# Patient Record
Sex: FEMALE | Race: OTHER
[De-identification: names, ages, dates, MRNs, and addresses within clinical notes are randomized per-mention and may not be internally consistent; named-entity substitution may affect disease eponyms.]

---

## 2020-05-04 ENCOUNTER — HOSPITAL ENCOUNTER (INPATIENT)
Dept: HOSPITAL 11 - JP.OB | Age: 20
LOS: 2 days | Discharge: HOME | End: 2020-05-06
Attending: ADVANCED PRACTICE MIDWIFE | Admitting: ADVANCED PRACTICE MIDWIFE
Payer: MEDICAID

## 2020-05-04 DIAGNOSIS — O48.0: Primary | ICD-10-CM

## 2020-05-04 DIAGNOSIS — Q79.60: ICD-10-CM

## 2020-05-04 DIAGNOSIS — Z3A.40: ICD-10-CM

## 2020-05-04 DIAGNOSIS — O99.89: ICD-10-CM

## 2020-05-04 PROCEDURE — 0KQM0ZZ REPAIR PERINEUM MUSCLE, OPEN APPROACH: ICD-10-PCS | Performed by: ADVANCED PRACTICE MIDWIFE

## 2020-05-04 PROCEDURE — 00HU33Z INSERTION OF INFUSION DEVICE INTO SPINAL CANAL, PERCUTANEOUS APPROACH: ICD-10-PCS | Performed by: ADVANCED PRACTICE MIDWIFE

## 2020-05-04 PROCEDURE — 3E0R3BZ INTRODUCTION OF ANESTHETIC AGENT INTO SPINAL CANAL, PERCUTANEOUS APPROACH: ICD-10-PCS | Performed by: ADVANCED PRACTICE MIDWIFE

## 2020-05-04 NOTE — ANES
DATE OF SERVICE:  05/04/2020

 

INDICATIONS:  Jennifer is a 20-year-old female, patient of Berta Reynolds in our obstetrics

unit. G1, P0 at 39 weeks and 5 days.  I was asked to assess her for labor epidural.  Upon

arrival, I found a 20-year-old healthy female.  Obesity.  Reviewed with her, her health

history as well as lab work and the procedure.  Found no contraindications and she was okay

to proceed.  Consent was received.

 

DESCRIPTION OF PROCEDURE:  I had her seated at the edge of the bed.  Betadine prep x3 to

lumbar region.  Sterile drape was placed, 1% lidocaine skin wheal as well as deep at the L3-

L4 region.  A 17-gauge Tuohy was placed to loss of resistance.  Negative CSF, negative heme,

negative paresthesia.  I placed silicone catheter to 14 cm.  The catheter was secured after

a test dose of 3 mL of 1.5% lidocaine and 1:200,000 epinephrine.  She tolerated the test

dose without any issues.  I placed her in the supine position, dosed her with 12 mL of 0.2%

ropivacaine and began infusion of 12 mL an hour of that same 0.2% ropivacaine.  She

tolerated that procedure quite well.  Please refer to nursing notes for vital signs and

neuro status, which are unchanged.

 

Thank you for the procedure.

 

 

 

 

Ino Oneill CRNA

DD:  05/04/2020 17:15:38

DT:  05/04/2020 22:48:49

Job #:  [id number]

## 2020-05-04 NOTE — PCM.PNLD
Labor Progress Note





- VS & Meds


Vital Signs: 


 Last Vital Signs











Temp  97.4 F   05/04/20 10:50


 


Pulse  108 H  05/04/20 15:31


 


Resp  18   05/04/20 15:31


 


BP  136/97 H  05/04/20 15:31


 


Pulse Ox  95   05/04/20 15:31











Active Medications: 


 Current Medications





Acetaminophen (Tylenol)  650 mg PO Q4H PRN


   PRN Reason: Pain (Mild 1-3) and fever


Ephedrine Sulfate (Ephedrine Sulfate)  10 mg IVPUSH ASDIRECTED PRN


   PRN Reason: Hypotension


Sodium Chloride (Saline Flush)  10 ml FLUSH ASDIRECTED PRN


   PRN Reason: Keep Vein Open





Discontinued Medications





Fentanyl (Sublimaze)  100 mcg IVPUSH ONETIME ONE


   Stop: 05/04/20 15:02


   Last Admin: 05/04/20 15:37 Dose:  100 mcg


Lactated Ringer's (Ringers, Lactated)  1,000 mls @ 999 mls/hr IV BOLUS ONE


   Stop: 05/04/20 11:13


   Last Admin: 05/04/20 17:23 Dose:  999 mls/hr


Oxytocin/Sodium Chloride (Pitocin In Ns 20 Units/1,000 Ml)  20 unit in 1,000 

mls @ 999 mls/hr IV ONETIME ONE; Protocol


   Stop: 05/04/20 11:13


Lactated Ringer's (Ringers, Lactated)  1,000 mls @ 999 mls/hr IV .BOLUS ONE


   Stop: 05/04/20 16:46


Oxytocin/Sodium Chloride (Pitocin In Ns 20 Units/1,000 Ml)  20 unit in 1,000 

mls @ 999 mls/hr IV ONETIME ONE; Protocol


   Stop: 05/04/20 17:30


Ropivacaine (Naropin 0.2%) Confirm Administered Dose 100 mls @ as directed 

.ROUTE .STK-MED ONE


   Stop: 05/04/20 17:22


Misoprostol (Cytotec)  50 mcg VAG ONETIME ONE


   Stop: 05/04/20 07:38


   Last Admin: 05/04/20 07:41 Dose:  50 mcg


Misoprostol (Cytotec)  50 mcg VAG ONETIME ONE


   Stop: 05/04/20 10:16


   Last Admin: 05/04/20 11:59 Dose:  50 mcg


Misoprostol (Cytotec)  25 mcg VAG ONETIME ONE


   Stop: 05/04/20 12:01


   Last Admin: 05/04/20 13:01 Dose:  Not Given











- Uterine Contractions


Uterine Monitoring Mode: None in Use


Contraction Frequency (min): 2-3


Contraction Duration (sec): 20-60


Contraction Intensity: Mild to Moderate


Uterine Resting Tone: Soft





- Fetal Monitoring


Fetal Monitor Mode: Doppler/Auscultation


Fetal Heart Rate (FHR) Baseline: 150


Fetal Heart Rate (FHR) Variability: Moderate (6-25 bmp)


Fetal Accelerations: Present, 15x15


Fetal Decelerations: None


Fetal Strip Review: Category I





- Vaginal Exam


Dilation (cm): 4


Effacement (Percent): 90


Station: 1


Cervical Position: Anterior


Sterile Vaginal Exam Performed By: Taylor Reynolds


Vaginal Exam Comment: AROM clear fluid.





- Labor Progress (Free Text)


Labor Progress: 





active labor


epidural inserted.


Cat one strip





plan for vaginal delivery

## 2020-05-04 NOTE — PCM.PNLD
Labor Progress Note





- VS & Meds


Vital Signs: 


 Last Vital Signs











Temp  97.4 F   05/04/20 10:50


 


Pulse  101 H  05/04/20 12:20


 


Resp  18   05/04/20 12:20


 


BP  141/96 H  05/04/20 12:20


 


Pulse Ox  98   05/04/20 12:20











Active Medications: 


 Current Medications





Acetaminophen (Tylenol)  650 mg PO Q4H PRN


   PRN Reason: Pain (Mild 1-3) and fever


Sodium Chloride (Saline Flush)  10 ml FLUSH ASDIRECTED PRN


   PRN Reason: Keep Vein Open





Discontinued Medications





Fentanyl (Sublimaze)  100 mcg IVPUSH ONETIME ONE


   Stop: 05/04/20 15:02


Lactated Ringer's (Ringers, Lactated)  1,000 mls @ 999 mls/hr IV BOLUS ONE


   Stop: 05/04/20 11:13


Oxytocin/Sodium Chloride (Pitocin In Ns 20 Units/1,000 Ml)  20 unit in 1,000 

mls @ 999 mls/hr IV ONETIME ONE; Protocol


   Stop: 05/04/20 11:13


Misoprostol (Cytotec)  50 mcg VAG ONETIME ONE


   Stop: 05/04/20 07:38


   Last Admin: 05/04/20 07:41 Dose:  50 mcg


Misoprostol (Cytotec)  50 mcg VAG ONETIME ONE


   Stop: 05/04/20 10:16


   Last Admin: 05/04/20 11:59 Dose:  50 mcg


Misoprostol (Cytotec)  25 mcg VAG ONETIME ONE


   Stop: 05/04/20 12:01


   Last Admin: 05/04/20 13:01 Dose:  Not Given











- Uterine Contractions


Uterine Monitoring Mode: None in Use


Contraction Frequency (min): 2-3


Contraction Duration (sec): 20-60


Contraction Intensity: Mild to Moderate


Uterine Resting Tone: Soft





- Fetal Monitoring


Fetal Monitor Mode: Doppler/Auscultation


Fetal Heart Rate (FHR) Baseline: 150


Fetal Heart Rate (FHR) Variability: Moderate (6-25 bmp)


Fetal Accelerations: Present, 15x15


Fetal Decelerations: None


Fetal Strip Review: Category I





- Vaginal Exam


Dilation (cm): 2-3


Effacement (Percent): 80


Station: 0


Cervical Position: Midposition


Sterile Vaginal Exam Performed By: Taylor Reynolds


Vaginal Exam Comment: bulging bag present.





- Labor Progress (Free Text)


Labor Progress: 





contractions stronger. cervical change from noon.


Requesting an epidural


Fentanyl given now for pain management





Plan


Vaginal delivery later


AROM after epidural

## 2020-05-04 NOTE — ANES
DATE OF SERVICE:  05/04/2020

 

Jennifer is a 20-year-old female patient of Taylor Reynolds in our Obstetric Unit.  I was

asked to come in and evaluate epidural due to the fact she had some increased pain.

Placement was intact.  I bolused the epidural with 5 mL of 0.2% ropivacaine and 2 mL with

100 mcg of fentanyl.  She tolerated that quite well.

 

 

 

 

Ino Oneill CRNA

DD:  05/04/2020 19:46:07

DT:  05/04/2020 22:48:33

Job #:  [id number]

## 2020-05-04 NOTE — PCM.DEL
L & D Note





- General Info


Date of Service: 20 (childbirth)


Mother's Due Date: 20





- Delivery Note


Labor: Spontaneous


Delivery Outcome: Livebirth


Infant Delivery Method: Spontaneous Vaginal Delivery-Single


Infant Delivery Mode: Spontaneous


Birth Presentation: Vertex


Nuchal Cord: None


Anesthesia Type: Epidural


Amniotic Fluid Description: Clear


Laceration: 2nd Degree, Labial, Perineal


Suture type: Chromic


Suture size: 3-0


Placenta: Intact, Expressed (Fernandez)


Cord: 3 Vessels


Estimated Blood Loss: 500


[place]: Bulb Syringe, Stimulated, Warmed, [place] Used


 Provider: Taylor Reynolds


APGAR Score 1 min: 8 (color)


APGAR Score 5 min: 9 (color)


Second Stage Interventions: Reports: Second Nurse Reviewed Fetal Heart Tones, 

Encouragement Given, Pushing Effectively, Pushing, McRobert's Position


Delivery Comments (Free Text/Narrative):: 


This 20 year old G3 now P1 who is 40 1/7 weeks gestation delivered via  at 

2130 in JESUS position. The  was placed on mother's abdomen where he cried 

spontaneously. Apgars 8,9 all for color. Three vessel cord, Delayed cord 

clamping was done and skin to skin with mother. The placenta was expressed 

intact a Fernandez.


Second degree tear of perineum and both labia with significant bleeding. All 

repaired in standard fashion with 3-0 Vicryl. Hemostasis was obtained. greater 

then 500cc of blood loss.


No lacerations of her cervix, vagina, rectum were found.


Mother and baby to post partum in stable condition


weight 9 pounds


first stage [id number]


Second stage 9112-8123


Third stage 3507-5215








Induction Criteria





- Olivas Score


Olivas Score Dilation: 1-2 cm


Olivas Score Effacement: 60-70%


Olivas Score Infant's Station: -1 ,0


Olivas Score Consistency: Soft


Olivas Score Cervix Position: Posterior


Olivas Score Total: 7


Olivas Score Presenting Part: Reports: Cephalic





- Induction


Gestational Age >/= 39 wks: Yes


Estimated Pelvis: Reports: Adequate


Reassuring Fetal Monitoring Strip: Yes


Absence of Tachy Systole: Yes





- General Info


Date of Service: 20


Admission Dx/Problem (Free Text): 


 Patient Status Order with Admit Dx/Problem





20 07:56


Patient Status [ADT] Routine 








 Admission Diagnosis/Problem











Admission Diagnosis/Problem    Pregnancy














Functional Status: Reports: Pain Controlled





- Review of Systems


General: Reports: No Symptoms


HEENT: Reports: No Symptoms


Pulmonary: Reports: No Symptoms


Cardiovascular: Reports: No Symptoms


Gastrointestinal: Reports: No Symptoms


Genitourinary: Reports: No Symptoms


Musculoskeletal: Reports: No Symptoms


Skin: Reports: No Symptoms


Neurological: Reports: No Symptoms


Psychiatric: Reports: No Symptoms





- Patient Data


Vitals - Most Recent: 


 Last Vital Signs











Temp  97.4 F   20 10:50


 


Pulse  121 H  20 18:15


 


Resp  18   20 18:15


 


BP  138/86   20 18:15


 


Pulse Ox  98   20 18:15











Weight - Most Recent: 201 lb


I&O - Last 24 Hours: 


 Intake & Output











 20





 14:59 22:59 06:59


 


Intake Total 800  


 


Output Total  525 


 


Balance 800 -525 











Lab Results Last 24 Hours: 


 Laboratory Results - last 24 hr











  20 Range/Units





  07:35 07:44 22:00 


 


WBC   7.7   (4.5-11.0)  K/uL


 


RBC   4.20   (3.30-5.50)  M/uL


 


Hgb   11.6 L   (12.0-15.0)  g/dL


 


Hct   35.2 L   (36.0-48.0)  %


 


MCV   84   (80-98)  fL


 


MCH   28   (27-31)  pg


 


MCHC   33   (32-36)  %


 


Plt Count   246   (150-400)  K/uL


 


Neut % (Auto)   70 H   (36-66)  %


 


Lymph % (Auto)   20 L   (24-44)  %


 


Mono % (Auto)   10 H   (2-6)  %


 


Eos % (Auto)   1 L   (2-4)  %


 


Baso % (Auto)   0   (0-1)  %


 


Urine Color  Yellow    (YELLOW)  


 


Urine Appearance  Cloudy A    (CLEAR)  


 


Urine pH  6.0    (5.0-8.0)  


 


Ur Specific Gravity  1.025    (1.008-1.030)  


 


Urine Protein  Negative    (NEGATIVE)  mg/dL


 


Urine Glucose (UA)  100 H    (NEGATIVE)  mg/dL


 


Urine Ketones  Trace H    (NEGATIVE)  mg/dL


 


Urine Occult Blood  Negative    (NEGATIVE)  


 


Urine Nitrite  Negative    (NEGATIVE)  


 


Urine Bilirubin  Negative    (NEGATIVE)  


 


Urine Urobilinogen  0.2    (0.2-1.0)  EU/dL


 


Ur Leukocyte Esterase  Small H    (NEGATIVE)  


 


Urine RBC  0-5    (0-5)  


 


Urine WBC  5-10 H    (0-5)  


 


Ur Epithelial Cells  Moderate    


 


Amorphous Sediment  Not seen    


 


Urine Bacteria  Moderate    


 


Urine Mucus  Few    


 


Blood Type    B POSITIVE  


 


Gel Antibody Screen    Negative  


 


Crossmatch    See Detail  











Med Orders - Current: 


 Current Medications





Acetaminophen (Tylenol)  650 mg PO Q4H PRN


   PRN Reason: Pain (Mild 1-3) and fever


Acetaminophen (Tylenol Bulk Bottle)  0 mg PO Q4H PRN


   PRN Reason: Pain


Docusate Sodium (Colace)  100 mg PO BID NAIMA


Ephedrine Sulfate (Ephedrine Sulfate)  10 mg IVPUSH ASDIRECTED PRN


   PRN Reason: Hypotension


   Last Admin: 20 17:55 Dose:  10 mg


Ferrous Sulfate (Ferrous Sulfate)  325 mg PO WITHBREAKFAST NAIMA


Hydrocortisone (Proctozone-Hc 2.5% Crm)  1 gm TOP ASDIRECTED PRN


   PRN Reason: Itching


Ibuprofen (Motrin Bulk Bottle)  600 mg PO Q6H PRN


   PRN Reason: Pain


Sodium Chloride (Saline Flush)  10 ml FLUSH ASDIRECTED PRN


   PRN Reason: Keep Vein Open





Discontinued Medications





Benzocaine (Rolq-Y-Ixvtjmt 20% Spray)  0 gm TOP Q4H ONE


   Stop: 20 22:54


Carboprost Tromethamine (Hemabate Ds) Confirm Administered Dose 250 mcg .ROUTE 

.STK-MED ONE


   Stop: 20 21:36


   Last Admin: 20 21:44 Dose:  Not Given


Emollient Ointment (Lansinoh Hpa)  1 gm TOP ASDIRECTED ONE


   Stop: 20 22:54


Fentanyl (Sublimaze)  100 mcg IVPUSH ONETIME ONE


   Stop: 20 15:02


   Last Admin: 20 15:37 Dose:  100 mcg


Fentanyl (Sublimaze) Confirm Administered Dose 100 mcg .ROUTE .STK-MED ONE


   Stop: 20 19:40


Lactated Ringer's (Ringers, Lactated)  1,000 mls @ 999 mls/hr IV BOLUS ONE


   Stop: 20 11:13


   Last Admin: 20 17:23 Dose:  999 mls/hr


Oxytocin/Sodium Chloride (Pitocin In Ns 20 Units/1,000 Ml)  20 unit in 1,000 

mls @ 999 mls/hr IV ONETIME ONE; Protocol


   Stop: 20 11:13


   Last Admin: 20 18:55 Dose:  Not Given


Lactated Ringer's (Ringers, Lactated)  1,000 mls @ 999 mls/hr IV .BOLUS ONE


   Stop: 20 16:46


   Last Admin: 20 18:55 Dose:  Not Given


Oxytocin/Sodium Chloride (Pitocin In Ns 20 Units/1,000 Ml)  20 unit in 1,000 

mls @ 999 mls/hr IV ONETIME ONE; Protocol


   Stop: 20 17:30


   Last Admin: 20 21:31 Dose:  999 mls/hr, 999 mls/hr


Ropivacaine (Naropin 0.2%) Confirm Administered Dose 100 mls @ as directed 

.ROUTE .STK-MED ONE


   Stop: 20 17:22


Oxytocin/Sodium Chloride (Pitocin In Ns 20 Units/1,000 Ml) Confirm Administered 

Dose 20 unit in 1,000 mls @ as directed .ROUTE .STK-MED ONE


   Stop: 20 21:57


Methylergonovine Maleate (Methergine) Confirm Administered Dose 0.2 mg .ROUTE 

.STK-MED ONE


   Stop: 20 21:36


   Last Admin: 20 21:44 Dose:  Not Given


Misoprostol (Cytotec)  50 mcg VAG ONETIME ONE


   Stop: 20 07:38


   Last Admin: 20 07:41 Dose:  50 mcg


Misoprostol (Cytotec)  50 mcg VAG ONETIME ONE


   Stop: 20 10:16


   Last Admin: 20 11:59 Dose:  50 mcg


Misoprostol (Cytotec)  25 mcg VAG ONETIME ONE


   Stop: 20 12:01


   Last Admin: 20 13:01 Dose:  Not Given


Misoprostol (Cytotec) Confirm Administered Dose 800 mcg .ROUTE .STK-MED ONE


   Stop: 20 21:35


   Last Admin: 20 21:44 Dose:  Not Given


Misoprostol (Cytotec)  800 mcg PO ONETIME ONE


   Stop: 20 21:44


   Last Admin: 20 21:36 Dose:  800 mcg


Witch Hazel (Tucks)  1 pad TOP ASDIRECTED ONE


   Stop: 20 22:54











- Exam


General: Alert, Oriented


HEENT: Pupils Equal, Pupils Reactive


Neck: Supple


Lungs: Clear to Auscultation, Normal Respiratory Effort


Cardiovascular: Regular Rate, Regular Rhythm


GI/Abdominal Exam: Normal Bowel Sounds, Soft, Non-Tender


 (Female) Exam: Normal External Exam, Enlarged Uterus, Vaginal Bleeding, 

Vaginal Tears


Back Exam: Normal Inspection, Full Range of Motion


Extremities: Normal Inspection, Normal Range of Motion, Non-Tender


Skin: Warm, Dry, Intact


Neurological: No New Focal Deficit


Psy/Mental Status: Alert, Normal Affect, Normal Mood





- Problem List & Annotations


(1) Nik-Danlos syndrome


SNOMED Code(s): 756934228


   Code(s): Q79.60 - NIK-DANLOS SYNDROME, UNSPECIFIED   Status: Acute   

Current Visit: Yes   





(2) Pregnancy


SNOMED Code(s): 44715334


   Code(s): Z34.90 - ENCNTR FOR SUPRVSN OF NORMAL PREGNANCY, UNSP, UNSP 

TRIMESTER   Status: Acute   Current Visit: Yes   


Qualifiers: 


   Weeks of gestation: 40 weeks   Qualified Code(s): Z3A.40 - 40 weeks 

gestation of pregnancy   





(3) Vaginal tear resulting from childbirth


SNOMED Code(s): 537422180


   Code(s): O71.4 - OBSTETRIC HIGH VAGINAL LACERATION ALONE   Status: Acute   

Current Visit: Yes   





(4) Vaginal delivery


SNOMED Code(s): 186607945


   Code(s): O80 - ENCOUNTER FOR FULL-TERM UNCOMPLICATED DELIVERY   Status: 

Acute   Current Visit: Yes   





(5) Active labor at term


SNOMED Code(s): 34849143


   Code(s): UNE8934 -    Status: Acute   Current Visit: Yes   





- Problem List Review


Problem List Initiated/Reviewed/Updated: Yes





- My Orders


Last 24 Hours: 


My Active Orders





20 07:53


Notify Provider Vital Signs [RC] PRN 


Acetaminophen [Tylenol]   650 mg PO Q4H PRN 


Sodium Chloride 0.9% [Saline Flush]   10 ml FLUSH ASDIRECTED PRN 


DVT/VTE Prophylaxis Reflex [OM.PC] Routine 





20 07:56


Communication Order [RC] ASDIRECTED 


Fetal Heart Tones [RC] PER UNIT ROUTINE 


Fetal Non Stress Test [RC] Click to Edit 


Notify Provider [RC] PRN 


Vital Signs [RC] PER UNIT ROUTINE 


Saline Lock Insert [OM.PC] Routine 





20 07:58


Antiembolic Devices [RC] .Routine 


VTE/DVT Education [RC] Click to Edit 





20 15:18


Communication Order [RC] Per Unit Routine 


Nitrous Oxide Delivery [RC] ASDIRECTED 


Oxygen Therapy [RC] ASDIRECTED 


Pulse Oximetry [RC] ASDIRECTED 


Verify Patient Consent Obtain [RC] ASDIRECTED 


Vital Signs [RC] PER UNIT ROUTINE 





20 15:46


Communication Order [RC] ASDIRECTED 


Local Anesthetic Infusion Pump [RC] ASDIRECTED 


PCEA Epidural [RC] ASDIRECTED 


PCEA Epidural [RC] ASDIRECTED 


Urinary Catheter Assessment [RC] ASDIRECTED 


ePHEDrine [ePHEDrine sulfate]   10 mg IVPUSH ASDIRECTED PRN 


Epidural Catheter Management [OM.PC] Urgent 





20 16:00


Insert Urinary Catheter [OM.PC] ASDIRECTED 





20 22:00


PATIENT RETYPE [BBK] Stat 


RED BLOOD CELLS LP [BBK] Stat 


TYPE AND SCREEN [BBK] Stat 





20 22:53


Hydrocortisone [Proctozone-HC 2.5% Crm]   1 gm TOP ASDIRECTED PRN 


Resuscitation Status Routine 





20 22:54


Patient Status [ADT] Routine 


Vital Signs [RC] PFP 





20 22:55


Ice Therapy [OM.PC] Per Unit Routine 


Perineal Care [OM.PC] Per Unit Routine 


Sitz Bath [OM.PC] Per Unit Routine 





20 23:02


Acetaminophen [Tylenol Bulk Bottle]   See Dose Instructions  PO Q4H PRN 


Ibuprofen [Motrin Bulk Bottle]   600 mg PO Q6H PRN 





20 00:01


CBC W/O DIFF,HEMOGRAM [HEME] Routine 





20 08:00


Ferrous Sulfate   325 mg PO WITHBREAKFAST 





20 09:00


Docusate Sodium [Colace]   100 mg PO BID 





20 05:11


CBC W/O DIFF,HEMOGRAM [HEME] AM 














- Assessment


Assessment:: 


20 year old   with second tears


Male , breastfeeding





Plan;


Routine cares


Hemogram at midnight and again at 0700


sitz bath


education on breastfeeding


48-72 hour stay








- Plan


Plan:: 


20 year  , two early losses who is 40 1/7 weeks presents with early labor 

this morning. AGUILAR 5/3/20.


She has a history of Nik-Danlos syndrome. She also lives over an hour away 

from the hospital.





Plan Augment contractions with Miso vaginal and assess for active labor. 





Labs:


HIV neg


GBS neg


ABO B pos


Rubella immune





active NST this morning


Baseline 


/0 soft posterior.

## 2020-05-04 NOTE — PCM.PNLD
Labor Progress Note





- VS & Meds


Vital Signs: 


 Last Vital Signs











Temp  97.4 F   05/04/20 10:50


 


Pulse  121 H  05/04/20 18:15


 


Resp  18   05/04/20 18:15


 


BP  138/86   05/04/20 18:15


 


Pulse Ox  98   05/04/20 18:15











Active Medications: 


 Current Medications





Acetaminophen (Tylenol)  650 mg PO Q4H PRN


   PRN Reason: Pain (Mild 1-3) and fever


Ephedrine Sulfate (Ephedrine Sulfate)  10 mg IVPUSH ASDIRECTED PRN


   PRN Reason: Hypotension


   Last Admin: 05/04/20 17:55 Dose:  10 mg


Sodium Chloride (Saline Flush)  10 ml FLUSH ASDIRECTED PRN


   PRN Reason: Keep Vein Open





Discontinued Medications





Fentanyl (Sublimaze)  100 mcg IVPUSH ONETIME ONE


   Stop: 05/04/20 15:02


   Last Admin: 05/04/20 15:37 Dose:  100 mcg


Fentanyl (Sublimaze) Confirm Administered Dose 100 mcg .ROUTE .STK-MED ONE


   Stop: 05/04/20 19:40


Lactated Ringer's (Ringers, Lactated)  1,000 mls @ 999 mls/hr IV BOLUS ONE


   Stop: 05/04/20 11:13


   Last Admin: 05/04/20 17:23 Dose:  999 mls/hr


Oxytocin/Sodium Chloride (Pitocin In Ns 20 Units/1,000 Ml)  20 unit in 1,000 

mls @ 999 mls/hr IV ONETIME ONE; Protocol


   Stop: 05/04/20 11:13


   Last Admin: 05/04/20 18:55 Dose:  Not Given


Lactated Ringer's (Ringers, Lactated)  1,000 mls @ 999 mls/hr IV .BOLUS ONE


   Stop: 05/04/20 16:46


   Last Admin: 05/04/20 18:55 Dose:  Not Given


Oxytocin/Sodium Chloride (Pitocin In Ns 20 Units/1,000 Ml)  20 unit in 1,000 

mls @ 999 mls/hr IV ONETIME ONE; Protocol


   Stop: 05/04/20 17:30


Ropivacaine (Naropin 0.2%) Confirm Administered Dose 100 mls @ as directed 

.ROUTE .STK-MED ONE


   Stop: 05/04/20 17:22


Misoprostol (Cytotec)  50 mcg VAG ONETIME ONE


   Stop: 05/04/20 07:38


   Last Admin: 05/04/20 07:41 Dose:  50 mcg


Misoprostol (Cytotec)  50 mcg VAG ONETIME ONE


   Stop: 05/04/20 10:16


   Last Admin: 05/04/20 11:59 Dose:  50 mcg


Misoprostol (Cytotec)  25 mcg VAG ONETIME ONE


   Stop: 05/04/20 12:01


   Last Admin: 05/04/20 13:01 Dose:  Not Given











- Uterine Contractions


Uterine Monitoring Mode: External [place]


Contraction Frequency (min): 1-2


Contraction Duration (sec): 30-40


Contraction Intensity: Strong


Uterine Resting Tone: Soft





- Fetal Monitoring


Fetal Monitor Mode: Doppler/Auscultation


Fetal Heart Rate (FHR) Baseline: 150


Fetal Heart Rate (FHR) Variability: Moderate (6-25 bmp)


Fetal Accelerations: Present, 15x15


Fetal Decelerations: None


Fetal Strip Review: Category I





- Vaginal Exam


Dilation (cm): rim


Effacement (Percent): 100


Station: 0


Cervical Position: Anterior


Sterile Vaginal Exam Performed By: Taylor Reynolds


Vaginal Exam Comment: rebolused epidural not great pain control. Progressing 

nicely





- Labor Progress (Free Text)


Labor Progress: 


transition


cat one strip





ancipitate vaginal delivery

## 2020-05-04 NOTE — PCM.PNLD
Labor Progress Note





- VS & Meds


Vital Signs: 


 Last Vital Signs











Temp  97.4 F   05/04/20 10:50


 


Pulse  100   05/04/20 11:05


 


Resp  18   05/04/20 11:05


 


BP  139/88   05/04/20 11:05


 


Pulse Ox  100   05/04/20 11:05











Active Medications: 


 Current Medications





Acetaminophen (Tylenol)  650 mg PO Q4H PRN


   PRN Reason: Pain (Mild 1-3) and fever


Misoprostol (Cytotec)  25 mcg VAG ONETIME ONE


   Stop: 05/04/20 12:01


Sodium Chloride (Saline Flush)  10 ml FLUSH ASDIRECTED PRN


   PRN Reason: Keep Vein Open





Discontinued Medications





Lactated Ringer's (Ringers, Lactated)  1,000 mls @ 999 mls/hr IV BOLUS ONE


   Stop: 05/04/20 11:13


Oxytocin/Sodium Chloride (Pitocin In Ns 20 Units/1,000 Ml)  20 unit in 1,000 

mls @ 999 mls/hr IV ONETIME ONE; Protocol


   Stop: 05/04/20 11:13


Misoprostol (Cytotec)  50 mcg VAG ONETIME ONE


   Stop: 05/04/20 07:38


   Last Admin: 05/04/20 07:41 Dose:  50 mcg


Misoprostol (Cytotec)  50 mcg VAG ONETIME ONE


   Stop: 05/04/20 10:16











- Uterine Contractions


Uterine Monitoring Mode: External [place], Palpation


Contraction Frequency (min): 2-3.5


Contraction Duration (sec): 40-70


Contraction Intensity: Mild


Uterine Resting Tone: Soft





- Fetal Monitoring


Fetal Monitor Mode: Doppler/Auscultation


Fetal Heart Rate (FHR) Baseline: 150


Fetal Heart Rate (FHR) Variability: Moderate (6-25 bmp)


Fetal Accelerations: Present, 15x15


Fetal Decelerations: None


Fetal Strip Review: Category I





- Vaginal Exam


Dilation (cm): 1-2


Effacement (Percent): 80


Station: 0


Cervical Position: Posterior


Sterile Vaginal Exam Performed By: Taylor Reynolds


Vaginal Exam Comment: membranes stripped, bloody show





- Labor Progress (Free Text)


Labor Progress: 





mary kate every 2-3 minutes.  baseline, moderate variability. 


1-2/80/0, posterior 





placed a second does of miso at 50 mcg


will reassess at 1530. Spontaneous, unlabored and symmetrical

## 2020-05-04 NOTE — PCM.LDHP
L&D History of Present Illness





- General


Date of Service: 20


Admit Problem/Dx: 


 Patient Status Order with Admit Dx/Problem





20 07:56


Patient Status [ADT] Routine 








 Admission Diagnosis/Problem











Admission Diagnosis/Problem    Pregnancy














Source of Information: Patient


History Limitations: Reports: No Limitations





- History of Present Illness


Timing/Duration: Reports: minutes: (3-4), gradual onset


Location, Pregnancy: Reports: Abdomen


Quality: Reports: Dull


Improves with: Reports: None


Worsens with: Reports: None





- Related Data


Allergies/Adverse Reactions: 


 Allergies











Allergy/AdvReac Type Severity Reaction Status Date / Time


 


codeine Allergy  Hallucinati Verified 10/16/19 12:33





   ons  


 


doxycycline Allergy  Hives Verified 10/16/19 12:32











Home Medications: 


 Home Meds





Pnv No.95/Ferrous Fum/Folic AC [Prenatal Caplet] 1 tab PO DAILY 10/16/19 [

History]


ondansetron HCL [Zofran] 4 mg PO Q8HR PRN 10/16/19 [History]











Past Medical History


OB/GYN History: Reports: None


: 3 (2 SAB early)


Para: 0


LMP (Approximate): Pregnant (AGUILAR 5/3/20)


Musculoskeletal History: Reports: Connective Tissue Disease





- Past Surgical History


HEENT Surgical History: Reports: Tonsillectomy





Social & Family History





- Tobacco Use


Smoking Status *Q: Never Smoker


Second Hand Smoke Exposure: No





- Caffeine Use


Caffeine Use: Reports: Coffee


Other Caffeine Use: 1 c/day





- Recreational Drug Use


Recreational Drug Use: No





H&P Review of Systems





- Review of Systems:


Review Of Systems: See Below


General: Reports: No Symptoms


HEENT: Reports: No Symptoms


Pulmonary: Reports: No Symptoms


Cardiovascular: Reports: No Symptoms


Gastrointestinal: Reports: No Symptoms


Genitourinary: Reports: No Symptoms


Musculoskeletal: Reports: Other (hyperflexibilty of ligamenta and joints)


Skin: Reports: No Symptoms


Psychiatric: Reports: No Symptoms


Neurological: Reports: No Symptoms


Hematologic/Lymphatic: Reports: No Symptoms


Immunologic: Reports: No Symptoms





L&D Exam





- Exam


Exam: See Below





- Vital Signs


Weight: 201 lb





- OB Specific


Contraction Intensity: Mild


Fetal Movement: Active


Fetal Heart Tones: Present


Fetal Heart Tones per Min: 150


Fetal Heart Rate (FHR) Variability: Moderate (6-25 bmp)


Birth Presentation: Vertex


Estimated Fetal Weight: 6-7 pounds





- Olivas Score


Olivas Score Cervix Position: Posterior


Olivas Score Consistency: Soft


Olivas Score Effacement: 51-70%


Olivas Score Dilation: 1-2 cm


Olivas Score Infant's Station: -1 ,0


Olivas Score Total: 7





- Exam


General: Alert, Oriented


HEENT: PERRLA


Neck: Supple


Lungs: Clear to Auscultation, Normal Respiratory Effort


Cardiovascular: Regular Rate, Regular Rhythm


GI/Abdominal Exam: Soft


Rectal Exam: Normal Exam


Genitourinary: Normal external exam, Cervical dilitation, Enlarged uterus


Back Exam: Normal Inspection


Extremities: No Pedal Edema, Normal Capillary Refill


Skin: Warm, Dry


Psychiatric: Alert





- Problem List


(1) Nik-Danlos syndrome


SNOMED Code(s): 646680043


   ICD Code: Q79.60 - NIK-DANLOS SYNDROME, UNSPECIFIED   Status: Acute   

Current Visit: Yes   





(2) Pregnancy


SNOMED Code(s): 89364411


   ICD Code: Z34.90 - ENCNTR FOR SUPRVSN OF NORMAL PREGNANCY, UNSP, UNSP 

TRIMESTER   Status: Acute   Current Visit: Yes   


Qualifiers: 


   Weeks of gestation: 40 weeks   Qualified Code(s): Z3A.40 - 40 weeks 

gestation of pregnancy   


Problem List Initiated/Reviewed/Updated: Yes


Orders Last 24hrs: 


 Active Orders 24 hr











 Category Date Time Status


 


 Patient Status [ADT] Routine ADT  20 07:56 Ordered


 


 Antiembolic Devices [RC] .Routine Care  20 07:58 Ordered


 


 Communication Order [RC] ASDIRECTED Care  20 07:56 Ordered


 


 Fetal Heart Tones [RC] PER UNIT ROUTINE Care  20 07:56 Ordered


 


 Fetal Non Stress Test [RC] Click to Edit Care  20 07:56 Ordered


 


 Notify Provider Vital Signs [RC] PRN Care  20 07:53 Ordered


 


 Notify Provider [RC] PRN Care  20 07:56 Ordered


 


 VTE/DVT Education [RC] Click to Edit Care  20 07:58 Ordered


 


 Vital Signs [RC] PER UNIT ROUTINE Care  20 07:56 Ordered


 


 CBC WITH AUTO DIFF [HEME] Routine Lab  20 07:44 Ordered


 


 UA W/MICROSCOPIC [URIN] Routine Lab  20 07:35 Ordered


 


 Acetaminophen [Tylenol] Med  20 07:53 Ordered





 650 mg PO Q4H PRN   


 


 Sodium Chloride 0.9% [Saline Flush] Med  20 07:53 Ordered





 10 ml FLUSH ASDIRECTED PRN   


 


 DVT/VTE Prophylaxis Reflex [OM.PC] Routine Oth  20 07:53 Ordered


 


 Saline Lock Insert [OM.PC] Routine Oth  20 07:56 Ordered


 


 Resuscitation Status Routine Resus Stat  20 07:53 Ordered








 Medication Orders





Acetaminophen (Tylenol)  650 mg PO Q4H PRN


   PRN Reason: Pain (Mild 1-3) and fever


Sodium Chloride (Saline Flush)  10 ml FLUSH ASDIRECTED PRN


   PRN Reason: Keep Vein Open








Assessment/Plan Comment:: 


20 year  , two early losses who is 40 1/7 weeks presents with early labor 

this morning. AGUILAR 5/3/20.


She has a history of Nik-Danlos syndrome. She also lives over an hour away 

from the hospital.





Plan Augment contractions with Miso vaginal and assess for active labor. 





Labs:


HIV neg


GBS neg


ABO B pos


Rubella immune





active NST this morning


Baseline 


CE /0 soft posterior.

## 2020-05-05 NOTE — PCM.PNPP
- General Info


Date of Service: 20 (PPD 1)


Admission Dx/Problem (Free Text): 


 Patient Status Order with Admit Dx/Problem





20 07:56


Patient Status [ADT] Routine 








 Admission Diagnosis/Problem











Admission Diagnosis/Problem    Pregnancy














Functional Status: Reports: Pain Controlled





- Review of Systems


General: Reports: No Symptoms


HEENT: Reports: No Symptoms


Pulmonary: Reports: No Symptoms


Cardiovascular: Reports: No Symptoms


Gastrointestinal: Reports: No Symptoms


Genitourinary: Reports: No Symptoms


Musculoskeletal: Reports: No Symptoms


Skin: Reports: No Symptoms


Neurological: Reports: No Symptoms


Psychiatric: Reports: No Symptoms





- Patient Data


Vital Signs - Most Recent: 


 Last Vital Signs











Temp  98.1 F   20 07:03


 


Pulse  101 H  20 07:03


 


Resp  18   20 07:03


 


BP  135/76   20 07:03


 


Pulse Ox  99   20 07:03











Weight - Most Recent: 201 lb


I&O - Last 24 Hours: 


 Intake & Output











 20





 22:59 06:59 14:59


 


Intake Total  4800 


 


Output Total 525 400 


 


Balance -525 4400 











Lab Results - Last 24 Hours: 


 Laboratory Results - last 24 hr











  20 Range/Units





  22:00 00:01 07:43 


 


WBC   20.0 H  10.3  (4.5-11.0)  K/uL


 


RBC   3.90  3.35  (3.30-5.50)  M/uL


 


Hgb   10.9 L  9.1 L  (12.0-15.0)  g/dL


 


Hct   32.4 L  28.0 L  (36.0-48.0)  %


 


MCV   83  84  (80-98)  fL


 


MCH   28  27  (27-31)  pg


 


MCHC   34  33  (32-36)  %


 


Plt Count   240  210  (150-400)  K/uL


 


Blood Type  B POSITIVE    


 


Gel Antibody Screen  Negative    


 


Crossmatch  See Detail    











Med Orders - Current: 


 Current Medications





Acetaminophen (Tylenol Bulk Bottle)  0 mg PO Q4H PRN


   PRN Reason: Pain


   Last Admin: 20 03:05 Dose:  650 mg


Benzocaine (Lbbo-Z-Uzlrijp 20% Spray)  0 gm TOP Q4H PRN


   PRN Reason: PAIN


   Last Admin: 20 08:46 Dose:  1 spray


Docusate Sodium (Colace)  100 mg PO BID NAIMA


   Last Admin: 20 08:37 Dose:  100 mg


Emollient Ointment (Lansinoh Hpa)  0 gm TOP ASDIRECTED PRN


   PRN Reason: SORE NIPPLES


   Last Admin: 20 08:45 Dose:  1 applic


Ephedrine Sulfate (Ephedrine Sulfate)  10 mg IVPUSH ASDIRECTED PRN


   PRN Reason: Hypotension


   Last Admin: 20 17:55 Dose:  10 mg


Ferrous Sulfate (Ferrous Sulfate)  325 mg PO WITHBREAKFAST NAIMA


   Last Admin: 20 08:37 Dose:  325 mg


Hydrocortisone (Proctozone-Hc 2.5% Crm)  1 gm TOP ASDIRECTED PRN


   PRN Reason: Itching


Ibuprofen (Motrin Bulk Bottle)  600 mg PO Q6H PRN


   PRN Reason: Pain


   Last Admin: 20 03:04 Dose:  600 mg


Sodium Chloride (Saline Flush)  10 ml FLUSH ASDIRECTED PRN


   PRN Reason: Keep Vein Open


Witch Hazel (Tucks)  1 pad TOP ASDIRECTED PRN


   PRN Reason: HEMORROIDS


   Last Admin: 20 08:45 Dose:  1 pad





Discontinued Medications





Acetaminophen (Tylenol)  650 mg PO Q4H PRN


   PRN Reason: Pain (Mild 1-3) and fever


Carboprost Tromethamine (Hemabate Ds) Confirm Administered Dose 250 mcg .ROUTE 

.STK-MED ONE


   Stop: 20 21:36


   Last Admin: 20 21:44 Dose:  Not Given


Fentanyl (Sublimaze)  100 mcg IVPUSH ONETIME ONE


   Stop: 20 15:02


   Last Admin: 20 15:37 Dose:  100 mcg


Fentanyl (Sublimaze) Confirm Administered Dose 100 mcg .ROUTE .STK-MED ONE


   Stop: 20 19:40


Lactated Ringer's (Ringers, Lactated)  1,000 mls @ 999 mls/hr IV BOLUS ONE


   Stop: 20 11:13


   Last Admin: 20 17:23 Dose:  999 mls/hr


Oxytocin/Sodium Chloride (Pitocin In Ns 20 Units/1,000 Ml)  20 unit in 1,000 

mls @ 999 mls/hr IV ONETIME ONE; Protocol


   Stop: 20 11:13


   Last Admin: 20 18:55 Dose:  Not Given


Lactated Ringer's (Ringers, Lactated)  1,000 mls @ 999 mls/hr IV .BOLUS ONE


   Stop: 20 16:46


   Last Admin: 20 18:55 Dose:  Not Given


Oxytocin/Sodium Chloride (Pitocin In Ns 20 Units/1,000 Ml)  20 unit in 1,000 

mls @ 999 mls/hr IV ONETIME ONE; Protocol


   Stop: 20 17:30


   Last Admin: 20 21:31 Dose:  999 mls/hr, 999 mls/hr


Ropivacaine (Naropin 0.2%) Confirm Administered Dose 100 mls @ as directed 

.ROUTE .STK-MED ONE


   Stop: 20 17:22


Oxytocin/Sodium Chloride (Pitocin In Ns 20 Units/1,000 Ml) Confirm Administered 

Dose 20 unit in 1,000 mls @ as directed .ROUTE .STK-MED ONE


   Stop: 20 21:57


   Last Admin: 20 22:00 Dose:  999 mls/hr


Methylergonovine Maleate (Methergine) Confirm Administered Dose 0.2 mg .ROUTE 

.STK-MED ONE


   Stop: 20 21:36


   Last Admin: 20 21:44 Dose:  Not Given


Misoprostol (Cytotec)  50 mcg VAG ONETIME ONE


   Stop: 20 07:38


   Last Admin: 20 07:41 Dose:  50 mcg


Misoprostol (Cytotec)  50 mcg VAG ONETIME ONE


   Stop: 20 10:16


   Last Admin: 20 11:59 Dose:  50 mcg


Misoprostol (Cytotec)  25 mcg VAG ONETIME ONE


   Stop: 20 12:01


   Last Admin: 20 13:01 Dose:  Not Given


Misoprostol (Cytotec) Confirm Administered Dose 800 mcg .ROUTE .STK-MED ONE


   Stop: 20 21:35


   Last Admin: 20 21:44 Dose:  Not Given


Misoprostol (Cytotec)  800 mcg PO ONETIME ONE


   Stop: 20 21:44


   Last Admin: 20 21:36 Dose:  800 mcg











- Infant Interaction


Infant Disposition, Postpartum: [place] in Room with Family


Infant Interaction: Holding Infant


Infant Feeding: Attempted Breastfeeding; Nursed Fair/Poor


Support Person: Significant Other





- Postpartum Recovery Exam


Fundal Tone: Firm


Fundal Level: 2 Fingerbreadths Below Umbilicus


Fundal Placement: Midline


Lochia Amount: Small


Lochia Color: Rubra/Red


Perineum Description: Intact, Minimal Bruising/Swelling


Other Perinuem Description: no pain with repair, flow light. no hematoma


Episiotomy/Laceration: Approximated


Bladder Status: Indwelling Catheter in Place


Urinary Elimination: Voided





- Exam


General: Alert, Oriented


HEENT: Pupils Equal


Neck: Supple


Lungs: Clear to Auscultation, Normal Respiratory Effort


Cardiovascular: Regular Rate, Regular Rhythm


GI/Abdominal Exam: Normal Bowel Sounds, Soft, Non-Tender, No Organomegaly, No 

Distention, No Abnormal Bruit, No Mass, Pelvis Stable


Extremities: Normal Inspection, Normal Range of Motion, Non-Tender, No Pedal 

Edema, Normal Capillary Refill


Skin: Warm, Dry, Intact


Wound/Incisions: Healing Well


Neurological: No New Focal Deficit


Psy/Mental Status: Alert, Normal Affect, Normal Mood





- Problem List & Annotations


(1) Nik-Danlos syndrome


SNOMED Code(s): 280900796


   Code(s): Q79.60 - NIK-DANLOS SYNDROME, UNSPECIFIED   Status: Acute   

Current Visit: Yes   





(2) Pregnancy


SNOMED Code(s): 51796075


   Code(s): Z34.90 - ENCNTR FOR SUPRVSN OF NORMAL PREGNANCY, UNSP, UNSP 

TRIMESTER   Status: Acute   Current Visit: Yes   


Qualifiers: 


   Weeks of gestation: 40 weeks   Qualified Code(s): Z3A.40 - 40 weeks 

gestation of pregnancy   





(3) Vaginal tear resulting from childbirth


SNOMED Code(s): 500875243


   Code(s): O71.4 - OBSTETRIC HIGH VAGINAL LACERATION ALONE   Status: Acute   

Current Visit: Yes   





(4) Vaginal delivery


SNOMED Code(s): 144893640


   Code(s): O80 - ENCOUNTER FOR FULL-TERM UNCOMPLICATED DELIVERY   Status: 

Acute   Current Visit: Yes   





(5) Active labor at term


SNOMED Code(s): 02371102


   Code(s): PMC9256 -    Status: Acute   Current Visit: Yes   





- Problem List Review


Problem List Initiated/Reviewed/Updated: Yes





- My Orders


Last 24 Hours: 


My Active Orders





20 15:18


Oxygen Therapy [RC] ASDIRECTED 


Vital Signs [RC] PER UNIT ROUTINE 





20 15:46


ePHEDrine [ePHEDrine sulfate]   10 mg IVPUSH ASDIRECTED PRN 


Epidural Catheter Management [OM.PC] Urgent 





20 16:00


Insert Urinary Catheter [OM.PC] ASDIRECTED 





20 22:00


PATIENT RETYPE [BBK] Stat 


RED BLOOD CELLS LP [BBK] Stat 


TYPE AND SCREEN [BBK] Stat 





20 22:53


Hydrocortisone [Proctozone-HC 2.5% Crm]   1 gm TOP ASDIRECTED PRN 


Resuscitation Status Routine 





20 22:54


Patient Status [ADT] Routine 





20 22:55


Ice Therapy [OM.PC] Per Unit Routine 


Perineal Care [OM.PC] Per Unit Routine 


Sitz Bath [OM.PC] Per Unit Routine 





20 23:02


Acetaminophen [Tylenol Bulk Bottle]   See Dose Instructions  PO Q4H PRN 


Ibuprofen [Motrin Bulk Bottle]   600 mg PO Q6H PRN 





20 08:00


Ferrous Sulfate   325 mg PO WITHBREAKFAST 





20 08:26


Lanolin [Lansinoh HPA]   0 gm TOP ASDIRECTED PRN 





20 08:27


Benzocaine [Xyll-Z-Mbnfnrx 20% Spray]   0 gm TOP Q4H PRN 


witch hazeL [Tucks]   1 pad TOP ASDIRECTED PRN 





20 09:00


Docusate Sodium [Colace]   100 mg PO BID 





20 05:11


CBC W/O DIFF,HEMOGRAM [HEME] AM 














- Assessment


Assessment:: 


20 year old   with second tears


Male , breastfeeding





Plan;


Routine cares


Hemogram at midnight and again at 0700


sitz bath


education on breastfeeding


48-72 hour stay


----------------------------


20


doing great this morning


up and about, no pain with repair


HGB 9.1, iron started


working on breastfeeding, baby not to interested





- Plan


Plan:: 


20 year  , two early losses who is 40 1/7 weeks presents with early labor 

this morning. AGUILAR 5/3/20.


She has a history of Nik-Danlos syndrome. She also lives over an hour away 

from the hospital.





Plan Augment contractions with Miso vaginal and assess for active labor. 





Labs:


HIV neg


GBS neg


ABO B pos


Rubella immune





active NST this morning


Baseline 


CE /0 soft posterior.





-------------------------------


20


routine cares


breastfeeding education


needs a 2 week post partum visit to check repair work.


48 hour stay

## 2020-05-06 NOTE — PCM.PNPP
- General Info


Date of Service: 20


Functional Status: Reports: Pain Controlled





- Review of Systems


General: Reports: No Symptoms


HEENT: Reports: No Symptoms


Pulmonary: Reports: No Symptoms


Cardiovascular: Reports: No Symptoms


Gastrointestinal: Reports: No Symptoms


Genitourinary: Reports: No Symptoms


Musculoskeletal: Reports: No Symptoms


Skin: Reports: No Symptoms


Neurological: Reports: No Symptoms


Psychiatric: Reports: No Symptoms





- General Info


Date of Service: 20





- Patient Data


Vital Signs - Most Recent: 


 Last Vital Signs











Temp  35.5 C L  20 07:00


 


Pulse  82   20 07:00


 


Resp  18   20 07:00


 


BP  109/78   20 07:00


 


Pulse Ox  99   20 07:00











Weight - Most Recent: 91.172 kg


Lab Results - Last 24 Hours: 


 Laboratory Results - last 24 hr











  20 Range/Units





  05:00 


 


WBC  7.5  (4.5-11.0)  K/uL


 


RBC  3.15 L  (3.30-5.50)  M/uL


 


Hgb  8.5 L  (12.0-15.0)  g/dL


 


Hct  27.0 L  (36.0-48.0)  %


 


MCV  86  (80-98)  fL


 


MCH  27  (27-31)  pg


 


MCHC  32  (32-36)  %


 


Plt Count  202  (150-400)  K/uL











Med Orders - Current: 


 Current Medications





Acetaminophen (Tylenol Bulk Bottle)  0 mg PO Q4H PRN


   PRN Reason: Pain


   Last Admin: 20 03:05 Dose:  650 mg


Benzocaine (Ogiw-R-Lzkoewu 20% Spray)  0 gm TOP Q4H PRN


   PRN Reason: PAIN


   Last Admin: 20 08:46 Dose:  1 spray


Bisacodyl (Dulcolax)  10 mg PO BID PRN


   PRN Reason: Constipation


   Last Admin: 20 16:16 Dose:  10 mg


Docusate Sodium (Colace)  100 mg PO BID NAIMA


   Last Admin: 20 20:13 Dose:  100 mg


Emollient Ointment (Lansinoh Hpa)  0 gm TOP ASDIRECTED PRN


   PRN Reason: SORE NIPPLES


   Last Admin: 20 08:45 Dose:  1 applic


Ferrous Sulfate (Ferrous Sulfate)  325 mg PO WITHBREAKFAST NAIMA


   Last Admin: 20 08:37 Dose:  325 mg


Hydrocortisone (Proctozone-Hc 2.5% Crm)  1 gm TOP ASDIRECTED PRN


   PRN Reason: Itching


Ibuprofen (Motrin Bulk Bottle)  600 mg PO Q6H PRN


   PRN Reason: Pain


   Last Admin: 20 03:04 Dose:  600 mg


Sodium Chloride (Saline Flush)  10 ml FLUSH ASDIRECTED PRN


   PRN Reason: Keep Vein Open


Witch Hazel (Tucks)  1 pad TOP ASDIRECTED PRN


   PRN Reason: HEMORROIDS


   Last Admin: 20 08:45 Dose:  1 pad





Discontinued Medications





Acetaminophen (Tylenol)  650 mg PO Q4H PRN


   PRN Reason: Pain (Mild 1-3) and fever


Carboprost Tromethamine (Hemabate Ds) Confirm Administered Dose 250 mcg .ROUTE 

.STK-MED ONE


   Stop: 20 21:36


   Last Admin: 20 21:44 Dose:  Not Given


Ephedrine Sulfate (Ephedrine Sulfate)  10 mg IVPUSH ASDIRECTED PRN


   PRN Reason: Hypotension


   Last Admin: 20 17:55 Dose:  10 mg


Fentanyl (Sublimaze)  100 mcg IVPUSH ONETIME ONE


   Stop: 20 15:02


   Last Admin: 20 15:37 Dose:  100 mcg


Fentanyl (Sublimaze) Confirm Administered Dose 100 mcg .ROUTE .STK-MED ONE


   Stop: 20 19:40


Lactated Ringer's (Ringers, Lactated)  1,000 mls @ 999 mls/hr IV BOLUS ONE


   Stop: 20 11:13


   Last Admin: 20 17:23 Dose:  999 mls/hr


Oxytocin/Sodium Chloride (Pitocin In Ns 20 Units/1,000 Ml)  20 unit in 1,000 

mls @ 999 mls/hr IV ONETIME ONE; Protocol


   Stop: 20 11:13


   Last Admin: 20 18:55 Dose:  Not Given


Lactated Ringer's (Ringers, Lactated)  1,000 mls @ 999 mls/hr IV .BOLUS ONE


   Stop: 20 16:46


   Last Admin: 20 18:55 Dose:  Not Given


Oxytocin/Sodium Chloride (Pitocin In Ns 20 Units/1,000 Ml)  20 unit in 1,000 

mls @ 999 mls/hr IV ONETIME ONE; Protocol


   Stop: 20 17:30


   Last Admin: 20 21:31 Dose:  999 mls/hr, 999 mls/hr


Ropivacaine (Naropin 0.2%) Confirm Administered Dose 100 mls @ as directed 

.ROUTE .STK-MED ONE


   Stop: 20 17:22


Oxytocin/Sodium Chloride (Pitocin In Ns 20 Units/1,000 Ml) Confirm Administered 

Dose 20 unit in 1,000 mls @ as directed .ROUTE .STK-MED ONE


   Stop: 20 21:57


   Last Admin: 20 22:00 Dose:  999 mls/hr


Methylergonovine Maleate (Methergine) Confirm Administered Dose 0.2 mg .ROUTE 

.STK-MED ONE


   Stop: 20 21:36


   Last Admin: 20 21:44 Dose:  Not Given


Misoprostol (Cytotec)  50 mcg VAG ONETIME ONE


   Stop: 20 07:38


   Last Admin: 20 07:41 Dose:  50 mcg


Misoprostol (Cytotec)  50 mcg VAG ONETIME ONE


   Stop: 20 10:16


   Last Admin: 20 11:59 Dose:  50 mcg


Misoprostol (Cytotec)  25 mcg VAG ONETIME ONE


   Stop: 20 12:01


   Last Admin: 20 13:01 Dose:  Not Given


Misoprostol (Cytotec) Confirm Administered Dose 800 mcg .ROUTE .STK-MED ONE


   Stop: 20 21:35


   Last Admin: 20 21:44 Dose:  Not Given


Misoprostol (Cytotec)  800 mcg PO ONETIME ONE


   Stop: 20 21:44


   Last Admin: 20 21:36 Dose:  800 mcg











- Infant Interaction


Infant Disposition, Postpartum: [place] in Room with Family


Infant Interaction: Holding Infant


Infant Feeding: Attempted Breastfeeding; Nursed Fair/Poor


Support Person: Significant Other





- Postpartum Recovery Exam


Fundal Tone: Firm


Fundal Level: 2 Fingerbreadths Below Umbilicus


Fundal Placement: Midline


Lochia Amount: Small


Lochia Color: Rubra/Red


Perineum Description: Edematous


Other Perinuem Description: no pain with repair, flow light. no hematoma


Episiotomy/Laceration: Approximated


Bladder Status: Voiding


Urinary Elimination: Voided





- Exam


General: Alert, Oriented, Cooperative


HEENT: Pupils Equal, Pupils Reactive, EOMI, Mucous Membr. Moist/Pink


Neck: Supple


Lungs: Clear to Auscultation, Normal Respiratory Effort


Cardiovascular: Regular Rate, Regular Rhythm


GI/Abdominal Exam: Normal Bowel Sounds, Soft, Non-Tender, No Organomegaly, No 

Distention, No Abnormal Bruit, No Mass, Pelvis Stable


Extremities: Normal Inspection, Normal Range of Motion, Non-Tender, No Pedal 

Edema, Normal Capillary Refill


Skin: Warm, Dry, Intact


Neurological: No New Focal Deficit


Psy/Mental Status: Alert, Normal Affect, Normal Mood


Physical Findings Comment:: 





perineum swollen but no signs of hematoma noted





- Problem List & Annotations


(1) Breastfeeding (infant)


SNOMED Code(s): 921454285


   Code(s): Z78.9 - OTHER SPECIFIED HEALTH STATUS   Status: Acute   Current 

Visit: Yes   





(2) Vaginal delivery


SNOMED Code(s): 412738513


   Code(s): O80 - ENCOUNTER FOR FULL-TERM UNCOMPLICATED DELIVERY   Status: 

Acute   Current Visit: Yes   





(3) Vaginal tear resulting from childbirth


SNOMED Code(s): 165146328


   Code(s): O71.4 - OBSTETRIC HIGH VAGINAL LACERATION ALONE   Status: Acute   

Current Visit: Yes   





- Problem List Review


Problem List Initiated/Reviewed/Updated: Yes





- Assessment


Assessment:: 


20 year old   with second tears


Male , breastfeeding





Plan;


Routine cares


Hemogram at midnight and again at 0700


sitz bath


education on breastfeeding


48-72 hour stay


----------------------------


20


doing great this morning


up and about, no pain with repair


HGB 9.1, iron started


working on breastfeeding, baby not to interested


----------------------------------


2020


Postpartum day two


Fundus firm and bleeding decreasing


Breastfeeding well


Voiding and passing gas


Discharge home today





- Plan


Plan:: 


20 year  , two early losses who is 40  weeks presents with early labor 

this morning. AGUILAR 5/3/20.


She has a history of Nik-Danlos syndrome. She also lives over an hour away 

from the hospital.





Plan Augment contractions with [place] vaginal and assess for active labor. 





Labs:


HIV neg


GBS neg


ABO B pos


Rubella immune





active NST this morning


Baseline 


CE 75/0 soft posterior.





-------------------------------


20


routine cares


breastfeeding education


needs a 2 week post partum visit to check repair work.


48 hour stay


--------------------------------


20


Continue routine cares


Continue to support and encourage breastfeeding 


needs a 2 week post partum visit to check repair work.


Discharge home today